# Patient Record
Sex: FEMALE
[De-identification: names, ages, dates, MRNs, and addresses within clinical notes are randomized per-mention and may not be internally consistent; named-entity substitution may affect disease eponyms.]

---

## 2024-07-19 ENCOUNTER — OFFICE VISIT (OUTPATIENT)
Facility: CLINIC | Age: 34
End: 2024-07-19
Payer: MEDICAID

## 2024-07-19 VITALS
SYSTOLIC BLOOD PRESSURE: 131 MMHG | RESPIRATION RATE: 14 BRPM | OXYGEN SATURATION: 99 % | WEIGHT: 203.6 LBS | HEIGHT: 62 IN | TEMPERATURE: 96.3 F | DIASTOLIC BLOOD PRESSURE: 79 MMHG | BODY MASS INDEX: 37.47 KG/M2 | HEART RATE: 105 BPM

## 2024-07-19 DIAGNOSIS — R45.1 RESTLESSNESS AND AGITATION: Primary | ICD-10-CM

## 2024-07-19 DIAGNOSIS — E28.2 PCOS (POLYCYSTIC OVARIAN SYNDROME): ICD-10-CM

## 2024-07-19 DIAGNOSIS — Z13.29 ENCOUNTER FOR SCREENING FOR ENDOCRINE DISORDER: ICD-10-CM

## 2024-07-19 DIAGNOSIS — K21.9 GASTROESOPHAGEAL REFLUX DISEASE, UNSPECIFIED WHETHER ESOPHAGITIS PRESENT: ICD-10-CM

## 2024-07-19 DIAGNOSIS — E78.5 HYPERLIPIDEMIA, UNSPECIFIED HYPERLIPIDEMIA TYPE: ICD-10-CM

## 2024-07-19 DIAGNOSIS — Z13.1 SCREENING FOR DIABETES MELLITUS: ICD-10-CM

## 2024-07-19 DIAGNOSIS — F84.0 AUTISM: ICD-10-CM

## 2024-07-19 DIAGNOSIS — J30.2 SEASONAL ALLERGIES: ICD-10-CM

## 2024-07-19 DIAGNOSIS — E66.9 OBESITY (BMI 30-39.9): ICD-10-CM

## 2024-07-19 PROCEDURE — 99203 OFFICE O/P NEW LOW 30 MIN: CPT | Performed by: FAMILY MEDICINE

## 2024-07-19 RX ORDER — AZELASTINE HYDROCHLORIDE AND FLUTICASONE PROPIONATE 137; 50 UG/1; UG/1
SPRAY, METERED NASAL
COMMUNITY
Start: 2024-06-28

## 2024-07-19 RX ORDER — RISPERIDONE 1 MG/1
1 TABLET, ORALLY DISINTEGRATING ORAL DAILY
COMMUNITY
Start: 2024-07-04 | End: 2024-07-19 | Stop reason: DRUGHIGH

## 2024-07-19 RX ORDER — RISPERIDONE 0.5 MG/1
0.5 TABLET ORAL 2 TIMES DAILY
Qty: 180 TABLET | Refills: 3 | Status: SHIPPED | OUTPATIENT
Start: 2024-07-19

## 2024-07-19 RX ORDER — CETIRIZINE HYDROCHLORIDE 1 MG/ML
10 SOLUTION ORAL DAILY
COMMUNITY
Start: 2024-05-02

## 2024-07-19 RX ORDER — OLOPATADINE HYDROCHLORIDE 2 MG/ML
1 SOLUTION/ DROPS OPHTHALMIC DAILY
COMMUNITY
Start: 2024-06-23

## 2024-07-19 RX ORDER — RISPERIDONE 1 MG/1
1 TABLET, ORALLY DISINTEGRATING ORAL DAILY
Qty: 180 TABLET | Refills: 3 | Status: SHIPPED | OUTPATIENT
Start: 2024-07-19

## 2024-07-19 NOTE — PROGRESS NOTES
\"Have you been to the ER, urgent care clinic since your last visit?  Hospitalized since your last visit?\"    NO    “Have you seen or consulted any other health care providers outside of [facility] since your last visit?”    NO     “Have you had a pap smear?”    YES - Where:  Nurse/CMA to request most recent records if not in the chart    No cervical cancer screening on file             Click Here for Release of Records Request

## 2024-07-19 NOTE — PROGRESS NOTES
Missy Kumari (:  1990) is a 34 y.o. female,Established patient, here for evaluation of the following chief complaint(s):  Establish Care      Assessment & Plan   1. Restlessness and agitation  -   REFILL  risperiDONE (RISPERDAL) 0.5 MG tablet; Take 1 tablet by mouth 2 times daily, Disp-180 tablet, R-3Please take with 1 mg of risperidone to equal 1.5 mg twice a dayNormal  -    REFILL risperiDONE (RISPERDAL M-TABS) 1 MG disintegrating tablet; Take 1 tablet by mouth daily, Disp-180 tablet, R-3Take along with the 0.5 mg dose of risperidone to equal a total of 1.5 mg twice daily.  Normal  2. Autism  -     Comprehensive Metabolic Panel; Future  3. Gastroesophageal reflux disease, unspecified whether esophagitis present  4. Seasonal allergies  5. PCOS (polycystic ovarian syndrome)  -     Hemoglobin A1C; Future  -     Comprehensive Metabolic Panel; Future  6. Encounter for screening for endocrine disorder  -     TSH + Free T4 Panel; Future  7. Screening for diabetes mellitus  -     Hemoglobin A1C; Future  8. Hyperlipidemia, unspecified hyperlipidemia type  -     Lipid Panel; Future  9. Obesity (BMI 30-39.9)          Autism-  Managed by Specialist     GERD-  Stable.   Not on any medications.  Modifying diet.  Does not eat spicy  and rarely eat fried foods.      Seasonal Allergies-   Managed by Specialist      PCOS-  Stable.   Managed by GYN    Obesity---Recommend a low calorie diet  -Portion control  -Patient encouraged to exercise for 30 minutes 3 to 5 times a week.  -Recommend eating a well balanced healthy diet. (Consistent of lean meats, lots of fruits, vegetables and whole grains).    -Limit processed foods.   -Drink 32 to 64 ounces of fluid each day  -Eat 2 to 3 g of fiber each day      Return in about 6 months (around 2025) for OV extended.  aggitation, GERD, seasonal allergies, Lung disorder.       Qi Gottlieb is a 34-year-old female who presents to establish care.  Former patient of [facility]

## 2025-01-20 ENCOUNTER — OFFICE VISIT (OUTPATIENT)
Facility: CLINIC | Age: 35
End: 2025-01-20
Payer: MEDICAID

## 2025-01-20 VITALS
WEIGHT: 201 LBS | OXYGEN SATURATION: 99 % | HEART RATE: 153 BPM | SYSTOLIC BLOOD PRESSURE: 124 MMHG | TEMPERATURE: 97.7 F | RESPIRATION RATE: 16 BRPM | DIASTOLIC BLOOD PRESSURE: 64 MMHG | BODY MASS INDEX: 36.99 KG/M2 | HEIGHT: 62 IN

## 2025-01-20 DIAGNOSIS — E66.812 CLASS 2 SEVERE OBESITY DUE TO EXCESS CALORIES WITH SERIOUS COMORBIDITY AND BODY MASS INDEX (BMI) OF 36.0 TO 36.9 IN ADULT: ICD-10-CM

## 2025-01-20 DIAGNOSIS — E28.2 PCOS (POLYCYSTIC OVARIAN SYNDROME): ICD-10-CM

## 2025-01-20 DIAGNOSIS — E78.5 HYPERLIPIDEMIA, UNSPECIFIED HYPERLIPIDEMIA TYPE: Primary | ICD-10-CM

## 2025-01-20 DIAGNOSIS — F84.0 AUTISM: ICD-10-CM

## 2025-01-20 DIAGNOSIS — K21.9 GASTROESOPHAGEAL REFLUX DISEASE, UNSPECIFIED WHETHER ESOPHAGITIS PRESENT: ICD-10-CM

## 2025-01-20 DIAGNOSIS — E66.01 CLASS 2 SEVERE OBESITY DUE TO EXCESS CALORIES WITH SERIOUS COMORBIDITY AND BODY MASS INDEX (BMI) OF 36.0 TO 36.9 IN ADULT: ICD-10-CM

## 2025-01-20 DIAGNOSIS — Z76.0 MEDICATION REFILL: ICD-10-CM

## 2025-01-20 DIAGNOSIS — Z28.21 INFLUENZA VACCINATION DECLINED: ICD-10-CM

## 2025-01-20 DIAGNOSIS — J30.2 SEASONAL ALLERGIES: ICD-10-CM

## 2025-01-20 DIAGNOSIS — J98.4 LUNG DISEASE: ICD-10-CM

## 2025-01-20 PROCEDURE — 99213 OFFICE O/P EST LOW 20 MIN: CPT | Performed by: FAMILY MEDICINE

## 2025-01-20 RX ORDER — ALBUTEROL SULFATE 0.83 MG/ML
2.5 SOLUTION RESPIRATORY (INHALATION) EVERY 4 HOURS PRN
Qty: 120 EACH | Refills: 3 | Status: SHIPPED | OUTPATIENT
Start: 2025-01-20

## 2025-01-20 RX ORDER — ALBUTEROL SULFATE 0.83 MG/ML
2.5 SOLUTION RESPIRATORY (INHALATION) EVERY 4 HOURS PRN
Qty: 120 EACH | Refills: 3 | Status: SHIPPED | OUTPATIENT
Start: 2025-01-20 | End: 2025-01-20

## 2025-01-20 RX ORDER — ALBUTEROL SULFATE 0.83 MG/ML
2.5 SOLUTION RESPIRATORY (INHALATION) EVERY 4 HOURS PRN
COMMUNITY
End: 2025-01-20 | Stop reason: SDUPTHER

## 2025-01-20 ASSESSMENT — ENCOUNTER SYMPTOMS
BLOOD IN STOOL: 0
NAUSEA: 0
ABDOMINAL PAIN: 0
VOMITING: 0
COUGH: 0
SHORTNESS OF BREATH: 0

## 2025-01-20 NOTE — PROGRESS NOTES
\"Have you been to the ER, urgent care clinic since your last visit?  Hospitalized since your last visit?\"    NO    “Have you seen or consulted any other health care providers outside our system since your last visit?”    NO     “Have you had a pap smear?”    NO    No cervical cancer screening on file             
autism disorder, seasonal allergies, recent restlessness and agitation.  She was brought to the office by both her parents.  Patient is here for follow-up.  Needs refills on her albuterol.  Also needs to complete lab work from July 2024 that was ordered.          Patient Care Team:  GI-    GYN-   Dr. Feli Benedict  Allergist-  Dr. Jackie Byrd  Dentist-  Dr. Swenson          Immunization:  Flu vaccine -  declined by patient caretaker.            Autism----  Patient has Autism.  She does take risperidone for restlessness and agitation.  Patient takes 1-1/2 tablets.  She  takes 2 separate pills; she takes a 1 mg dose and 0.5 mg dose to equal 1.5 mg orally each day.      GERD-   Patient currently not taking anything for acid reflux.  She does not eat fried foods or spicy foods.  She is diet controlling.  She does see Dr. Keenan, gastroenterologist.   Has an appointment tomorrow.           Seasonal allergies--she is currently taking Zyrtec  (liquid solution) and Dymista.  She is followed by an allergist, Dr. Jackie Byrd.      PCOS-  patient with no new issues.  She is managed by GYN specialist Dr. Feli Lora-Jak    Gastroesophageal Reflux  She reports no abdominal pain, no chest pain, no coughing or no nausea. Pertinent negatives include no fatigue.         Past Medical History:   Diagnosis Date    Autism disorder     GERD (gastroesophageal reflux disease)     Lung disorder     PCOS (polycystic ovarian syndrome)     Restlessness and agitation     Seasonal allergies      Past Surgical History:   Procedure Laterality Date    BRONCHOSCOPY       Current Outpatient Medications   Medication Sig    albuterol (PROVENTIL) (2.5 MG/3ML) 0.083% nebulizer solution Take 3 mLs by nebulization every 4 hours as needed for Wheezing or Shortness of Breath    olopatadine (PATADAY) 0.2 % SOLN ophthalmic solution Place 1 drop into both eyes daily    cetirizine (ZYRTEC) 1 MG/ML SOLN syrup Take 10 mLs by mouth

## 2025-01-21 ENCOUNTER — HOSPITAL ENCOUNTER (OUTPATIENT)
Facility: HOSPITAL | Age: 35
Setting detail: SPECIMEN
Discharge: HOME | End: 2025-01-24

## 2025-01-21 LAB — SENTARA SPECIMEN COLLECTION: NORMAL

## 2025-01-21 PROCEDURE — 99001 SPECIMEN HANDLING PT-LAB: CPT

## 2025-01-21 NOTE — ASSESSMENT & PLAN NOTE
Stable.   Not on any medications.   -Managed by GI Specialist- .  Modifying diet.  Does not eat spicy  and rarely eat fried foods.

## 2025-01-21 NOTE — ASSESSMENT & PLAN NOTE
-Recommend a low calorie diet  -Patient encouraged to exercise for 30 minutes 3 to 5 times a week.    -Recommend eating a well balanced healthy diet. (Consistent of lean meats, lots of fruits, vegetables and whole grains).    -Limit processed foods.   -Drink 32 to 64 ounces of fluid each day  -Eat 2 to 3 g of fiber each day

## 2025-01-21 NOTE — ASSESSMENT & PLAN NOTE
Last LDL cholesterol was 118 on 10/17/2022  NOT ON A STATIN  On the diet control cholesterol levels

## 2025-01-22 LAB
A/G RATIO: 1.3 RATIO (ref 1.1–2.6)
ALBUMIN: 4.4 G/DL (ref 3.5–5)
ALP BLD-CCNC: 117 U/L (ref 25–115)
ALT SERPL-CCNC: 21 U/L (ref 5–40)
ANION GAP SERPL CALCULATED.3IONS-SCNC: 19 MMOL/L (ref 3–15)
AST SERPL-CCNC: 38 U/L (ref 10–37)
BILIRUB SERPL-MCNC: 0.2 MG/DL (ref 0.2–1.2)
BUN BLDV-MCNC: 8 MG/DL (ref 6–22)
CALCIUM SERPL-MCNC: 9.7 MG/DL (ref 8.4–10.5)
CHLORIDE BLD-SCNC: 102 MMOL/L (ref 98–110)
CHOLESTEROL, TOTAL: 214 MG/DL (ref 110–200)
CHOLESTEROL/HDL RATIO: 3.8 (ref 0–5)
CO2: 18 MMOL/L (ref 20–32)
CREAT SERPL-MCNC: 0.9 MG/DL (ref 0.5–1.2)
ESTIMATED AVERAGE GLUCOSE: 103 MG/DL (ref 91–123)
GFR, ESTIMATED: >60 ML/MIN/1.73 SQ.M.
GLOBULIN: 3.3 G/DL (ref 2–4)
GLUCOSE: 117 MG/DL (ref 70–99)
HBA1C MFR BLD: 5.2 % (ref 4.8–5.6)
HDLC SERPL-MCNC: 57 MG/DL
LDL CHOLESTEROL: 138 MG/DL (ref 50–99)
LDL/HDL RATIO: 2.4
NON-HDL CHOLESTEROL: 157 MG/DL
POTASSIUM SERPL-SCNC: 4 MMOL/L (ref 3.5–5.5)
SODIUM BLD-SCNC: 139 MMOL/L (ref 133–145)
T4 FREE: 1.2 NG/DL (ref 0.9–1.8)
TOTAL PROTEIN: 7.7 G/DL (ref 6.4–8.3)
TRIGL SERPL-MCNC: 91 MG/DL (ref 40–149)
TSH SERPL DL<=0.05 MIU/L-ACNC: 1.99 MCU/ML (ref 0.27–4.2)
VLDLC SERPL CALC-MCNC: 18 MG/DL (ref 8–30)

## 2025-04-11 LAB — PAP SMEAR, EXTERNAL: NORMAL

## 2025-07-21 ENCOUNTER — OFFICE VISIT (OUTPATIENT)
Facility: CLINIC | Age: 35
End: 2025-07-21
Payer: MEDICAID

## 2025-07-21 VITALS
RESPIRATION RATE: 18 BRPM | TEMPERATURE: 97.8 F | HEART RATE: 125 BPM | BODY MASS INDEX: 38.61 KG/M2 | HEIGHT: 62 IN | DIASTOLIC BLOOD PRESSURE: 87 MMHG | OXYGEN SATURATION: 97 % | WEIGHT: 209.8 LBS | SYSTOLIC BLOOD PRESSURE: 138 MMHG

## 2025-07-21 DIAGNOSIS — Z11.4 ENCOUNTER FOR SCREENING FOR HIV: ICD-10-CM

## 2025-07-21 DIAGNOSIS — E66.812 CLASS 2 SEVERE OBESITY DUE TO EXCESS CALORIES WITH SERIOUS COMORBIDITY AND BODY MASS INDEX (BMI) OF 38.0 TO 38.9 IN ADULT (HCC): ICD-10-CM

## 2025-07-21 DIAGNOSIS — E66.01 CLASS 2 SEVERE OBESITY DUE TO EXCESS CALORIES WITH SERIOUS COMORBIDITY AND BODY MASS INDEX (BMI) OF 38.0 TO 38.9 IN ADULT (HCC): ICD-10-CM

## 2025-07-21 DIAGNOSIS — K21.9 GASTROESOPHAGEAL REFLUX DISEASE, UNSPECIFIED WHETHER ESOPHAGITIS PRESENT: ICD-10-CM

## 2025-07-21 DIAGNOSIS — J30.2 SEASONAL ALLERGIES: Primary | ICD-10-CM

## 2025-07-21 DIAGNOSIS — F84.0 AUTISM: ICD-10-CM

## 2025-07-21 DIAGNOSIS — H61.22 CERUMEN DEBRIS ON TYMPANIC MEMBRANE, LEFT: ICD-10-CM

## 2025-07-21 DIAGNOSIS — R45.1 RESTLESSNESS AND AGITATION: ICD-10-CM

## 2025-07-21 DIAGNOSIS — E78.5 HYPERLIPIDEMIA, UNSPECIFIED HYPERLIPIDEMIA TYPE: ICD-10-CM

## 2025-07-21 DIAGNOSIS — Z11.59 NEED FOR HEPATITIS C SCREENING TEST: ICD-10-CM

## 2025-07-21 LAB
A/G RATIO: 1.3 RATIO (ref 1.1–2.6)
ALBUMIN: 4.5 G/DL (ref 3.5–5)
ALP BLD-CCNC: 139 U/L (ref 25–115)
ALT SERPL-CCNC: 21 U/L (ref 5–40)
ANION GAP SERPL CALCULATED.3IONS-SCNC: 16 MMOL/L (ref 3–15)
AST SERPL-CCNC: 21 U/L (ref 10–37)
BILIRUB SERPL-MCNC: 0.3 MG/DL (ref 0.2–1.2)
BUN BLDV-MCNC: 9 MG/DL (ref 6–22)
CALCIUM SERPL-MCNC: 9.9 MG/DL (ref 8.4–10.5)
CHLORIDE BLD-SCNC: 104 MMOL/L (ref 98–110)
CHOLESTEROL, TOTAL: 203 MG/DL (ref 110–200)
CHOLESTEROL/HDL RATIO: 3.6 (ref 0–5)
CO2: 21 MMOL/L (ref 20–32)
CREAT SERPL-MCNC: 0.9 MG/DL (ref 0.5–1.2)
GFR, ESTIMATED: 81.4 ML/MIN/1.73 SQ.M.
GLOBULIN: 3.4 G/DL (ref 2–4)
GLUCOSE: 104 MG/DL (ref 70–99)
HDLC SERPL-MCNC: 57 MG/DL
HEPATITIS C ANTIBODY: NORMAL
HIV INTERPRETATION: NORMAL
HIV1/0/2 AB/AG: NON REACTIVE
LDL CHOLESTEROL: 112 MG/DL (ref 50–99)
LDL/HDL RATIO: 2
NON-HDL CHOLESTEROL: 146 MG/DL
POTASSIUM SERPL-SCNC: 4 MMOL/L (ref 3.5–5.5)
SODIUM BLD-SCNC: 141 MMOL/L (ref 133–145)
TOTAL PROTEIN: 7.9 G/DL (ref 6.4–8.3)
TRIGL SERPL-MCNC: 167 MG/DL (ref 40–149)
VLDLC SERPL CALC-MCNC: 33 MG/DL (ref 8–30)

## 2025-07-21 PROCEDURE — 99001 SPECIMEN HANDLING PT-LAB: CPT

## 2025-07-21 PROCEDURE — 69210 REMOVE IMPACTED EAR WAX UNI: CPT | Performed by: FAMILY MEDICINE

## 2025-07-21 PROCEDURE — 99214 OFFICE O/P EST MOD 30 MIN: CPT | Performed by: FAMILY MEDICINE

## 2025-07-21 RX ORDER — RISPERIDONE 1 MG/1
1 TABLET, ORALLY DISINTEGRATING ORAL DAILY
Qty: 180 TABLET | Refills: 3 | Status: SHIPPED | OUTPATIENT
Start: 2025-07-21 | End: 2025-07-21 | Stop reason: DRUGHIGH

## 2025-07-21 RX ORDER — RISPERIDONE 1 MG/1
1 TABLET, ORALLY DISINTEGRATING ORAL DAILY
Qty: 90 TABLET | Refills: 3 | Status: SHIPPED | OUTPATIENT
Start: 2025-07-21

## 2025-07-21 RX ORDER — CETIRIZINE HYDROCHLORIDE 1 MG/ML
10 SOLUTION ORAL DAILY
Qty: 300 ML | Refills: 1 | Status: SHIPPED | OUTPATIENT
Start: 2025-07-21

## 2025-07-21 SDOH — ECONOMIC STABILITY: FOOD INSECURITY: WITHIN THE PAST 12 MONTHS, THE FOOD YOU BOUGHT JUST DIDN'T LAST AND YOU DIDN'T HAVE MONEY TO GET MORE.: NEVER TRUE

## 2025-07-21 SDOH — ECONOMIC STABILITY: FOOD INSECURITY: WITHIN THE PAST 12 MONTHS, YOU WORRIED THAT YOUR FOOD WOULD RUN OUT BEFORE YOU GOT MONEY TO BUY MORE.: NEVER TRUE

## 2025-07-21 ASSESSMENT — PATIENT HEALTH QUESTIONNAIRE - PHQ9
1. LITTLE INTEREST OR PLEASURE IN DOING THINGS: NOT AT ALL
SUM OF ALL RESPONSES TO PHQ QUESTIONS 1-9: 0
2. FEELING DOWN, DEPRESSED OR HOPELESS: NOT AT ALL
SUM OF ALL RESPONSES TO PHQ QUESTIONS 1-9: 0

## 2025-07-21 ASSESSMENT — ENCOUNTER SYMPTOMS
NAUSEA: 0
BLOOD IN STOOL: 0
ABDOMINAL PAIN: 0
COUGH: 0
SHORTNESS OF BREATH: 0
VOMITING: 0

## 2025-07-21 NOTE — PROGRESS NOTES
Missy Kumari (:  1990) is a 35 y.o. female,Established patient, here for evaluation of the following chief complaint(s):  Polycystic Ovarian Syndrome, Gastroesophageal Reflux, and Allergies         Assessment & Plan  Restlessness and agitation  -Stable on risperidone 1 mg once a day    Orders:    risperiDONE (RISPERDAL M-TABS) 1 MG disintegrating tablet; Take 1 tablet by mouth daily    Seasonal allergies  -Stable on certrazine    Orders:    cetirizine (ZYRTEC) 1 MG/ML SOLN syrup; Take 10 mLs by mouth daily    Hyperlipidemia, unspecified hyperlipidemia type  Uncontrolled.   (2025) T chol 214, LDL chol 138  Not on a statin.  Trying to diet control cholesterol levels    Orders:    Lipid Panel; Future    Comprehensive Metabolic Panel; Future    Gastroesophageal reflux disease, unspecified whether esophagitis present  -Stable.   Not on any medications.   -Managed by GI Specialist- .  Modifying diet.  Does not eat spicy  and rarely eat fried foods.     Orders:    Comprehensive Metabolic Panel; Future    Autism  Managed by Specialist     Orders:    Comprehensive Metabolic Panel; Future    Encounter for screening for HIV  Orders:    HIV 1/2 Ag/Ab, 4TH Generation,W Rflx Confirm; Future    Need for hepatitis C screening test  Orders:    Hepatitis C Antibody; Future    Cerumen debris on tympanic membrane, left  Removed hard cerumen debris with ear curettage       Orders:    REMOVE IMPACTED EAR WAX    Class 2 severe obesity due to excess calories with serious comorbidity and body mass index (BMI) of 38.0 to 38.9 in adult (HCC)  -Recommend a low calorie diet  -Patient encouraged to exercise for 30 minutes 3 to 5 times a week.    -Recommend eating a well balanced healthy diet. (Consistent of lean meats, lots of fruits, vegetables and whole grains).    -Limit processed foods.   -Drink 32 to 64 ounces of fluid each day  -Eat 2 to 3 g of fiber each day             Return in about 6 months (around

## 2025-07-21 NOTE — ASSESSMENT & PLAN NOTE
Uncontrolled.   (1/20/2025) T chol 214, LDL chol 138  Not on a statin.  Trying to diet control cholesterol levels    Orders:    Lipid Panel; Future    Comprehensive Metabolic Panel; Future

## 2025-07-21 NOTE — ASSESSMENT & PLAN NOTE
-Stable.   Not on any medications.   -Managed by GI Specialist- .  Modifying diet.  Does not eat spicy  and rarely eat fried foods.     Orders:    Comprehensive Metabolic Panel; Future

## 2025-07-21 NOTE — ASSESSMENT & PLAN NOTE
-Stable on certrazine    Orders:    cetirizine (ZYRTEC) 1 MG/ML SOLN syrup; Take 10 mLs by mouth daily

## 2025-07-22 ENCOUNTER — HOSPITAL ENCOUNTER (OUTPATIENT)
Facility: HOSPITAL | Age: 35
Setting detail: SPECIMEN
Discharge: HOME | End: 2025-07-24

## 2025-07-22 LAB — SENTARA SPECIMEN COLLECTION: NORMAL
